# Patient Record
Sex: FEMALE
[De-identification: names, ages, dates, MRNs, and addresses within clinical notes are randomized per-mention and may not be internally consistent; named-entity substitution may affect disease eponyms.]

---

## 2020-02-14 ENCOUNTER — APPOINTMENT (OUTPATIENT)
Dept: PHYSICAL MEDICINE AND REHAB | Facility: CLINIC | Age: 80
End: 2020-02-14
Payer: MEDICARE

## 2020-02-14 VITALS
HEART RATE: 75 BPM | DIASTOLIC BLOOD PRESSURE: 84 MMHG | HEIGHT: 60 IN | SYSTOLIC BLOOD PRESSURE: 161 MMHG | BODY MASS INDEX: 37.3 KG/M2 | WEIGHT: 190 LBS

## 2020-02-14 DIAGNOSIS — Z98.890 OTHER SPECIFIED POSTPROCEDURAL STATES: ICD-10-CM

## 2020-02-14 DIAGNOSIS — M79.2 SPONDYLOSIS W/OUT MYELOPATHY OR RADICULOPATHY, CERVICAL REGION: ICD-10-CM

## 2020-02-14 DIAGNOSIS — M75.01 ADHESIVE CAPSULITIS OF RIGHT SHOULDER: ICD-10-CM

## 2020-02-14 DIAGNOSIS — E66.3 OVERWEIGHT: ICD-10-CM

## 2020-02-14 DIAGNOSIS — M35.3 POLYMYALGIA RHEUMATICA: ICD-10-CM

## 2020-02-14 DIAGNOSIS — M19.90 UNSPECIFIED OSTEOARTHRITIS, UNSPECIFIED SITE: ICD-10-CM

## 2020-02-14 DIAGNOSIS — M47.812 SPONDYLOSIS W/OUT MYELOPATHY OR RADICULOPATHY, CERVICAL REGION: ICD-10-CM

## 2020-02-14 DIAGNOSIS — R26.89 OTHER ABNORMALITIES OF GAIT AND MOBILITY: ICD-10-CM

## 2020-02-14 DIAGNOSIS — R29.898 OTHER SYMPTOMS AND SIGNS INVOLVING THE MUSCULOSKELETAL SYSTEM: ICD-10-CM

## 2020-02-14 DIAGNOSIS — M79.10 MYALGIA, UNSPECIFIED SITE: ICD-10-CM

## 2020-02-14 PROCEDURE — 99204 OFFICE O/P NEW MOD 45 MIN: CPT

## 2020-02-14 NOTE — PHYSICAL EXAM
[FreeTextEntry1] : PHYSICAL EXAM : OBJECTIVE \par \par GENERAL : Awake ,alert and oriented to time place and person \par HEAD : normocephalic and atraumatic \par NECK : supple ,no tracheal deviation ,no thyroid enlargement noted with swallowing\par EYES : sclera and conjunctiva normal no redness,intact extraocular movements \par ENT  : ears and nose normal in appearance -hearing adequate \par PULMONARY: effort normal. No respiratory distress. breathing regular. No wheezes \par LYMPH : No swelling in limbs, capillary return within normal range \par CVS : warm extremities,no palpitations,not short of breath, no visible jugular venous distention\par PSYCH : mood and affect normal ,good eye contact ,normal attention \par ABDOMEN : no visible distension , \par NEUROLOGICAL:cranial nerves intact,muscle tone normal,gait and balance safe except where noted below \par SKIN : warm and dry No rash detected over specific body areas examined \par MUSCULOSKELETAL: normal muscle bulk, no focal bony tenderness /posture normal except where specified below\par R shoulder abduction max 100 \par IR 20 pain \par frozen PROM max 120 with pain \par cuff 4/5 =+ drop arm test \par kyphosis head forward 3FB \par left arm full ROM but pain at ends \par neg tinels \par + spurlings test \par \par no thenar atrophy \par weak quads \par walks no cane- slowed \par trigger points R traps +++\par No long tract signs found on clinical exam and no focal neurological deficits\par

## 2020-02-14 NOTE — CONSULT LETTER
[FreeTextEntry1] : Dear Dr. ANUSHA WYATT  , \par \par I had the pleasure of evaluating your patient, ABDULAZIZ DE OLIVEIRA .\par \par Thank you very much for allowing me to participate in the care of this patient. If you have any questions, please do not hesitate to contact me. \par \par Sincerely, \par Bridger Jha MD \par \par ABPMR Board Certified in Physical Medicine and Rehabilitation\par Certified Fellow of AANEM (Neuromuscular and Electrodiagnostic Medicine)\par Subspecialty certified in Sports Medicine (ABPMR)\par \par  of Physical Medicine and Rehabilitation\par [facility] [employer] [facility]\par [facility]\par \par

## 2020-02-14 NOTE — HISTORY OF PRESENT ILLNESS
[FreeTextEntry1] : Ms. 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 The patient's symptoms are aggravated by activity   and alleviated by NSAIDS  . The patient denies any night pain, only early morning  numbness/tingling, some arm weakness, or bowel/bladder dysfunction. The patient has  other complaints at this time left shoulder hurts from overuse\par her knees also hurt \par she has had ankle issues in past \par she is off steroids now as she said did not help \par she has not had PT for over a year \par pain even goes under R breast and R ear and back of head at times .\par she is retired used to work in [employer] \par she does not exercise regularly \par

## 2020-02-14 NOTE — ASSESSMENT
[FreeTextEntry1] : \par PLAN AND RECOMMENDATIONS :\par \par We discussed differential diagnosis and clinical impression\par i think she has a concomitant R  cervical radiculopathy given the shooting pain and numbness in digits \par may have some CTS but early morning numbness resolves in minutes ..does not warrant EMG at this stage \par \par Recommend\par .symptomatic care and support\par  medications cont nsaids she has tried many meds only thing that works is diclofenac \par  imaging not needed -extensive in past -clinical approach needed \par  referral to PT \par  hydrotherapy /heat / cold for pain\par   Continue spinal precautions including care with bending, lifting, twisting and  carrying\par .FF spine is 50 degrees stiff back -poor core noted \par gait a bit waddling too -weak hip abductors \par \par  relative rest and avoidance of painful activity where possible \par  increasing activity as discussed needs to lose weight and walk more or bike -she has a bike she told me !\par  return for follow up 6-8 weeks after PT \par \par monitor hand numbness -EMG if get s worst \par

## 2020-02-14 NOTE — DATA REVIEWED
[MRI] : MRI [Plain X-Rays] : plain X-Rays [FreeTextEntry1] : brought in her old xrays films and reports and CD

## 2020-02-14 NOTE — REVIEW OF SYSTEMS
[Fatigue] : fatigue [Joint Stiffness] : joint stiffness [Joint Pain] : joint pain [Muscle Weakness] : muscle weakness [Muscle Pain] : muscle pain [Negative] : Heme/Lymph [Fever] : no fever [Chills] : no chills [Recent Change In Weight] : ~T no recent weight change [Lower Ext Edema] : no lower extremity edema [FreeTextEntry9] : R shoulder neck forward shoulder weak